# Patient Record
Sex: MALE | Race: WHITE
[De-identification: names, ages, dates, MRNs, and addresses within clinical notes are randomized per-mention and may not be internally consistent; named-entity substitution may affect disease eponyms.]

---

## 2018-12-29 ENCOUNTER — HOSPITAL ENCOUNTER (EMERGENCY)
Dept: HOSPITAL 91 - E/R | Age: 83
Discharge: HOME | End: 2018-12-29
Payer: COMMERCIAL

## 2018-12-29 ENCOUNTER — HOSPITAL ENCOUNTER (EMERGENCY)
Dept: HOSPITAL 10 - E/R | Age: 83
Discharge: HOME | End: 2018-12-29
Payer: COMMERCIAL

## 2018-12-29 VITALS — RESPIRATION RATE: 15 BRPM | DIASTOLIC BLOOD PRESSURE: 62 MMHG | SYSTOLIC BLOOD PRESSURE: 158 MMHG | HEART RATE: 78 BPM

## 2018-12-29 VITALS — HEIGHT: 60 IN | WEIGHT: 178.57 LBS | BODY MASS INDEX: 35.06 KG/M2

## 2018-12-29 DIAGNOSIS — H10.32: Primary | ICD-10-CM

## 2018-12-29 PROCEDURE — 99283 EMERGENCY DEPT VISIT LOW MDM: CPT

## 2018-12-29 NOTE — ERD
ER Documentation


Chief Complaint


Chief Complaint





LEFT EYE REDNESS AND ITCHY





HPI


This is a 86-year-old male here for left eye redness for 2 weeks.  The patient 


has a history of bilateral corneal transplants and is been complaining of left 


itchy eye and erythema to the conjunctiva for 2 weeks.  He says that he has some


slight clear discharge and has some blurry vision on the left eye that is 


slightly worse than baseline.  There is no pain.  No headache.  No vertigo no 


focal neurological complaints no fever.  He said the main complaint that he had 


was his eye was itching but it stopped 2 days ago.  The patient has a corneal 


ophthalmologist





ROS


All systems reviewed and are negative except as per history of present illness.





Medications


Home Meds


Active Scripts


Ciprofloxacin Opht* (Ciloxan*) 0.3%-3.5 Opht Oint, 1 APPLIC LEFT EYE QID, #1 EA


   Prov:CRISTINA BLACK DO         12/29/18





PMhx/Soc


Medical and Surgical Hx:  pt denies Medical Hx, pt denies Surgical Hx


History of Surgery:  No


Anesthesia Reaction:  No


Hx Neurological Disorder:  No


Hx Respiratory Disorders:  No


Hx Cardiac Disorders:  No


Hx Psychiatric Problems:  No


Hx Miscellaneous Medical Probl:  Yes (bladder stone, corneal transplant, and 


cateract surgery )


Hx Alcohol Use:  No


Hx Substance Use:  No


Hx Tobacco Use:  No


Smoking Status:  Never smoker





FmHx


Family History:  No coronary disease





Physical Exam


Vitals





Vital Signs


  Date      Temp  Pulse  Resp  B/P (MAP)   Pulse Ox  O2          O2 Flow    FiO2


Time                                                 Delivery    Rate


  12/29/18  98.1     79    18      165/76        99  Room Air


     18:13                          (105)


  12/29/18  98.1     77    18      176/71        99


     15:59                          (106)





Physical Exam


Const:   No acute distress


Head:   Atraumatic 


Eyes:    The left pupil is equal round reactive to light, the left eye has 


injected conjunctiva no raymundo-orbital swelling or erythema


ENT:    Normal External Ears, Nose and Mouth.


Neck:               Full range of motion. No meningismus.


Resp:   Clear to auscultation bilaterally


Cardio:   Regular rate and rhythm, no murmurs


Abd:    Soft, non tender, non distended. Normal bowel sounds


Skin:   No petechiae or rashes


Back:   No midline or flank tenderness


Ext:    No cyanosis, or edema


Neur:   Awake and alert


Psych:    Normal Mood and Affect





Procedures/MDM


Patient likely has some type of allergic conjunctivitis due to the itching or 


may have bacterial/viral.  Will treat with Ciloxan and told patient to call his 


eye doctor on Monday morning





Departure


Diagnosis:  


   Primary Impression:  


   Conjunctivitis


   Conjunctivitis type:  acute  Acute conjunctivitis type:  bacterial  


   Laterality:  left  Qualified Codes:  H10.32 - Unspecified acute conjunctiviti


   s, left eye


Condition:  Stable


Patient Instructions:  Conjunctivitis Caused by Infection











CRISTINA BLACK DO         Dec 29, 2018 18:42